# Patient Record
Sex: MALE | URBAN - METROPOLITAN AREA
[De-identification: names, ages, dates, MRNs, and addresses within clinical notes are randomized per-mention and may not be internally consistent; named-entity substitution may affect disease eponyms.]

---

## 2023-05-02 ENCOUNTER — OFFICE VISIT (OUTPATIENT)
Facility: LOCATION | Age: 53
End: 2023-05-02
Payer: COMMERCIAL

## 2023-05-02 DIAGNOSIS — H04.123 DRY EYE SYNDROME OF BILATERAL LACRIMAL GLANDS: ICD-10-CM

## 2023-05-02 DIAGNOSIS — H25.13 AGE-RELATED NUCLEAR CATARACT, BILATERAL: ICD-10-CM

## 2023-05-02 DIAGNOSIS — H40.2211 CHRONIC ANGLE-CLOSURE GLAUCOMA, RIGHT EYE, MILD STAGE: Primary | ICD-10-CM

## 2023-05-02 DIAGNOSIS — H40.013 OPEN ANGLE WITH BORDERLINE FINDINGS, LOW RISK, BILATERAL: ICD-10-CM

## 2023-05-02 DIAGNOSIS — H40.062 PRIMARY ANGLE CLOSURE WITHOUT GLAUCOMA DAMAGE, LEFT EYE: ICD-10-CM

## 2023-05-02 PROCEDURE — 76514 ECHO EXAM OF EYE THICKNESS: CPT | Performed by: OPHTHALMOLOGY

## 2023-05-02 PROCEDURE — 99204 OFFICE O/P NEW MOD 45 MIN: CPT | Performed by: OPHTHALMOLOGY

## 2023-05-02 PROCEDURE — 92133 CPTRZD OPH DX IMG PST SGM ON: CPT | Performed by: OPHTHALMOLOGY

## 2023-05-02 PROCEDURE — 92020 GONIOSCOPY: CPT | Performed by: OPHTHALMOLOGY

## 2023-05-02 ASSESSMENT — KERATOMETRY
OD: 43.81
OS: 44.13

## 2023-05-02 ASSESSMENT — INTRAOCULAR PRESSURE
OD: 14
OD: 24
OS: 19
OS: 12

## 2023-05-02 NOTE — IMPRESSION/PLAN
Impression: Patient presents today for evaluation of glaucoma. Referred by [facility]. POHx: CACG OD, PACS OS, Cataracts OU, JOJO, (+) ocular trauma OD. FOHx: Negative to glaucoma. PMHx: Arthritis, Cold sores, allergies. Eye med: None. Plan: Testing:
Fundus photos: pending. OCT/ONH 5/2023: OD thin IT NFL/GCC. OS normal NFL/GCC. Baseline. HVF 24-2: pending. Pachy: 554/550. Gonio 5/2023: OS PTM 2+ 360. OD JORGITO S/I, Closed N/T. Today:
IOP unacceptable OD, borderline acceptable OS. OCT ONH and Pachymetry performed and reviewed. Discussed pathophysiology of glaucoma with patient at length. Discussed known risk factors for glaucoma such as age, high intraocular pressure, family history, thin central corneal thickness, suspicious optic nerve appearance, myopia, Black,  or  heritage, certain medical conditions, such as diabetes, migraines, high blood pressure and sickle cell anemia. Educated the patient about higher risk of glaucoma development based on presence of risk factors identified during today's visit on the exam and optic nerve testing. Informed about importance of regular follow ups to able to identify early signs of glaucomatous optic neuropathy and prevent irreversible vision loss. Discussed findings on baseline OCT with patient. Informed patient of suspicious optic nerves due to asymmetry between eyes. Informed patient early thinning OD and large cup OD. 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 Discussed the need to lower IOPs. Discussed R/B/A/Is of LPI as temporary treatment vs. cataract surgery as permanent treatment to open drainage system and lower IOPs. Patient understands and elects to proceed with LPI. Plan:
Monitor. Schedule for LPI OD then OS. Next step glaucoma gtt if IOP remain unacceptable s/p LPI.

## 2023-05-02 NOTE — IMPRESSION/PLAN
Impression: Examination revealed cataracts. Trace NS OU.
NVS OU. Plan: Will revisit once patient is visually bothered by cataracts or IOPs become unacceptable due to phacomorphic angles.

## 2023-05-02 NOTE — IMPRESSION/PLAN
Impression: Examination revealed dry eye syndrome secondary to tear deficiencies. Trace SPK OU. Plan: Recommend patient to use ATs.

## 2023-05-23 ENCOUNTER — OFFICE VISIT (OUTPATIENT)
Facility: LOCATION | Age: 53
End: 2023-05-23
Payer: COMMERCIAL

## 2023-05-23 DIAGNOSIS — H40.2211 CHRONIC ANGLE-CLOSURE GLAUCOMA, RIGHT EYE, MILD STAGE: Primary | ICD-10-CM

## 2023-05-23 PROCEDURE — 66761 REVISION OF IRIS: CPT | Performed by: OPHTHALMOLOGY

## 2023-05-23 RX ORDER — PREDNISOLONE ACETATE 10 MG/ML
1 % SUSPENSION/ DROPS OPHTHALMIC
Qty: 10 | Refills: 0 | Status: ACTIVE
Start: 2023-05-23

## 2023-05-23 ASSESSMENT — INTRAOCULAR PRESSURE
OD: 22
OD: 20
OS: 17

## 2023-05-23 NOTE — IMPRESSION/PLAN
Impression: Patient presents today for LPI OD. POHx: CACG OD, PACS OS, Cataracts OU, JOJO, (+) ocular trauma OD. FOHx: Negative to glaucoma. PMHx: Arthritis, Cold sores, allergies. Eye med: None. Plan: Testing:
Fundus photos: pending. OCT/ONH 5/2023: OD thin IT NFL/GCC. OS normal NFL/GCC. Baseline. HVF 24-2: pending. Pachy: 554/550. Gonio 5/2023: OS PTM 2+ 360. OD JORGITO S/I, Closed N/T. Previously:
IOP unacceptable OD, borderline acceptable OS. Informed patient of suspicious optic nerves due to asymmetry between eyes. Informed patient early thinning OD and large cup OD. 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 Discussed the need to lower IOPs. Discussed R/B/A/Is of LPI as temporary treatment vs. cataract surgery as permanent treatment to open drainage system and lower IOPs. Patient understands and elects to proceed with LPI. Today: LPI OD performed and reviewed. Plan:
Start Prednisolone QID OD x 1 week then d/c.
RTC as scheduled for LPI OS. Next step glaucoma gtt if IOP remain unacceptable s/p LPI.

## 2023-05-30 ENCOUNTER — PROCEDURE (OUTPATIENT)
Facility: LOCATION | Age: 53
End: 2023-05-30
Payer: COMMERCIAL

## 2023-05-30 DIAGNOSIS — H40.062 PRIMARY ANGLE CLOSURE WITHOUT GLAUCOMA DAMAGE, LEFT EYE: Primary | ICD-10-CM

## 2023-05-30 PROCEDURE — 66761 REVISION OF IRIS: CPT | Performed by: OPHTHALMOLOGY

## 2023-05-30 RX ORDER — PREDNISOLONE ACETATE 10 MG/ML
1 % SUSPENSION/ DROPS OPHTHALMIC
Qty: 5 | Refills: 1 | Status: ACTIVE
Start: 2023-05-30

## 2023-05-30 ASSESSMENT — INTRAOCULAR PRESSURE
OD: 26
OD: 23
OS: 18
OS: 19

## 2023-05-30 NOTE — IMPRESSION/PLAN
Impression: Patient presents today for LPI OS. POHx: CACG OD, PACS OS, Cataracts OU, JOJO, (+) ocular trauma OD. FOHx: Negative to glaucoma. PMHx: Arthritis, Cold sores, allergies. Eye med: None. TMAX: Not established. Target IOP: Not established. Plan: Testing:
Fundus photos: pending. OCT/ONH 5/2023: OD thin IT NFL/GCC. OS normal NFL/GCC. Baseline. HVF 24-2: pending. Pachy: 554/550. Gonio 5/2023: OS PTM 2+ 360. OD JORGITO S/I, Closed N/T. Previously:
IOP unacceptable OD, borderline acceptable OS. Informed patient of suspicious optic nerves due to asymmetry between eyes. Informed patient early thinning OD and large cup OD. 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 Discussed the need to lower IOPs. Discussed R/B/A/Is of LPI as temporary treatment vs. cataract surgery as permanent treatment to open drainage system and lower IOPs. Patient understands and elects to proceed with LPI. Today: LPI OS performed and reviewed. Plan:
Start Prednisolone QID OS x 1 week then d/c.
RTC in 2 weeks with IOP check. Next step glaucoma gtt if IOP remain unacceptable s/p LPI.

## 2023-06-13 ENCOUNTER — OFFICE VISIT (OUTPATIENT)
Facility: LOCATION | Age: 53
End: 2023-06-13
Payer: COMMERCIAL

## 2023-06-13 DIAGNOSIS — H04.123 DRY EYE SYNDROME OF BILATERAL LACRIMAL GLANDS: ICD-10-CM

## 2023-06-13 DIAGNOSIS — H40.2211 CHRONIC ANGLE-CLOSURE GLAUCOMA, RIGHT EYE, MILD STAGE: Primary | ICD-10-CM

## 2023-06-13 DIAGNOSIS — H25.13 AGE-RELATED NUCLEAR CATARACT, BILATERAL: ICD-10-CM

## 2023-06-13 DIAGNOSIS — H40.062 PRIMARY ANGLE CLOSURE WITHOUT GLAUCOMA DAMAGE, LEFT EYE: ICD-10-CM

## 2023-06-13 PROCEDURE — 99213 OFFICE O/P EST LOW 20 MIN: CPT | Performed by: OPHTHALMOLOGY

## 2023-06-13 PROCEDURE — 92020 GONIOSCOPY: CPT | Performed by: OPHTHALMOLOGY

## 2023-06-13 ASSESSMENT — INTRAOCULAR PRESSURE
OS: 19
OD: 19
OD: 18

## 2023-06-13 NOTE — IMPRESSION/PLAN
Impression: Examination revealed dry eye syndrome secondary to tear deficiencies. Plan: Recommend patient to use ATs.

## 2023-06-13 NOTE — IMPRESSION/PLAN
Impression: 2 week follow up with IOP check and gonioscopy after LPI OU. POHx: CACG OD, PACS OS, S/p SLPI OU 05/2023, Cataracts OU, (+) ocular trauma OD. FOHx: Negative to glaucoma. PMHx: Arthritis, Cold sores, allergies. Eye med: None. Plan: Testing:
Fundus photos: pending. OCT/ONH 5/2023: OD thin IT NFL/GCC. OS normal NFL/GCC. Baseline. HVF 24-2: pending. Pachy: 554/550. Gonio 5/2023: OS PTM 2+ 360. OD JORGITO S/I, Closed N/T. Gonio 06/2023: SS 3+ 360 OU. Today:
IOP acceptable OU post LPI OU. Informed patient drainage system appears more open now post LPI OU. Plan:
Monitor. RTC in 4 months with OCT/ONH OU and Gonioscopy. Next step glaucoma gtt if IOP remain unacceptable s/p LPI.

## 2024-06-04 ENCOUNTER — OFFICE VISIT (OUTPATIENT)
Facility: LOCATION | Age: 54
End: 2024-06-04
Payer: COMMERCIAL

## 2024-06-04 DIAGNOSIS — H40.2211 CHRONIC ANGLE-CLOSURE GLAUCOMA, RIGHT EYE, MILD STAGE: Primary | ICD-10-CM

## 2024-06-04 DIAGNOSIS — H25.13 AGE-RELATED NUCLEAR CATARACT, BILATERAL: ICD-10-CM

## 2024-06-04 DIAGNOSIS — H04.123 DRY EYE SYNDROME OF BILATERAL LACRIMAL GLANDS: ICD-10-CM

## 2024-06-04 DIAGNOSIS — H40.062 PRIMARY ANGLE CLOSURE WITHOUT GLAUCOMA DAMAGE, LEFT EYE: ICD-10-CM

## 2024-06-04 PROCEDURE — 99213 OFFICE O/P EST LOW 20 MIN: CPT | Performed by: OPHTHALMOLOGY

## 2024-06-04 PROCEDURE — 92020 GONIOSCOPY: CPT | Performed by: OPHTHALMOLOGY

## 2024-06-04 ASSESSMENT — INTRAOCULAR PRESSURE
OS: 20
OD: 20